# Patient Record
(demographics unavailable — no encounter records)

---

## 2025-07-03 NOTE — HISTORY OF PRESENT ILLNESS
[TextBox_4] : 59-year-old male who was diagnosed with obstructive sleep apnea syndrome more than 10 years ago.  He initially was placed on CPAP but could not tolerate it.  He then wore a dental device and is still wearing a dental device which is about 3 years old now.  He goes to bed between 10 and 11 and wakes up at 6.  He has nocturia x 1.  He is not rested in the morning and somewhat sleepy during the day with an [place] score of 7.  He continues to snore loudly with witnessed apneas.  He is 6 feet tall weight 250.  His weight is stable.  His last sleep study was more than 10 years ago.  Past medical history celiac disease, ADD, questionable minor TIA.  Medications were reviewed.  He is non-smoker nondrinker.

## 2025-07-03 NOTE — ASSESSMENT
[FreeTextEntry1] : Patient with persistent symptoms of sleep apnea despite the use of an oral device.  I will recommend a home sleep study without the dental device in place as a baseline.  He states he refuses to use CPAP therapy and would like to be reevaluated for another oral appliance.  We will need a baseline home sleep study which will be ordered.  He is to call me for results.